# Patient Record
Sex: MALE | Race: WHITE | Employment: FULL TIME | URBAN - NONMETROPOLITAN AREA
[De-identification: names, ages, dates, MRNs, and addresses within clinical notes are randomized per-mention and may not be internally consistent; named-entity substitution may affect disease eponyms.]

---

## 2019-09-04 ENCOUNTER — OFFICE VISIT (OUTPATIENT)
Dept: FAMILY MEDICINE CLINIC | Age: 39
End: 2019-09-04
Payer: COMMERCIAL

## 2019-09-04 VITALS
HEIGHT: 75 IN | HEART RATE: 75 BPM | OXYGEN SATURATION: 98 % | BODY MASS INDEX: 26.24 KG/M2 | SYSTOLIC BLOOD PRESSURE: 105 MMHG | TEMPERATURE: 98 F | DIASTOLIC BLOOD PRESSURE: 70 MMHG | WEIGHT: 211 LBS

## 2019-09-04 DIAGNOSIS — J06.9 UPPER RESPIRATORY TRACT INFECTION, UNSPECIFIED TYPE: Primary | ICD-10-CM

## 2019-09-04 PROCEDURE — 99213 OFFICE O/P EST LOW 20 MIN: CPT | Performed by: PHYSICIAN ASSISTANT

## 2019-09-04 RX ORDER — BROMPHENIRAMINE MALEATE, PSEUDOEPHEDRINE HYDROCHLORIDE, AND DEXTROMETHORPHAN HYDROBROMIDE 2; 30; 10 MG/5ML; MG/5ML; MG/5ML
5 SYRUP ORAL 4 TIMES DAILY PRN
Qty: 120 ML | Refills: 0 | Status: SHIPPED | OUTPATIENT
Start: 2019-09-04 | End: 2019-09-11

## 2021-02-12 ENCOUNTER — OFFICE VISIT (OUTPATIENT)
Dept: FAMILY MEDICINE CLINIC | Age: 41
End: 2021-02-12
Payer: COMMERCIAL

## 2021-02-12 VITALS
OXYGEN SATURATION: 98 % | HEIGHT: 75 IN | BODY MASS INDEX: 25.12 KG/M2 | SYSTOLIC BLOOD PRESSURE: 120 MMHG | WEIGHT: 202 LBS | HEART RATE: 67 BPM | DIASTOLIC BLOOD PRESSURE: 78 MMHG | TEMPERATURE: 96.9 F

## 2021-02-12 DIAGNOSIS — S61.208A OPEN WOUND OF INDEX FINGER: ICD-10-CM

## 2021-02-12 DIAGNOSIS — S67.190A CRUSHING INJURY OF RIGHT INDEX FINGER, INITIAL ENCOUNTER: Primary | ICD-10-CM

## 2021-02-12 PROCEDURE — 99214 OFFICE O/P EST MOD 30 MIN: CPT | Performed by: PHYSICIAN ASSISTANT

## 2021-02-12 RX ORDER — DOXYCYCLINE 100 MG/1
100 CAPSULE ORAL 2 TIMES DAILY
Qty: 20 CAPSULE | Refills: 0 | Status: SHIPPED | OUTPATIENT
Start: 2021-02-12 | End: 2021-02-22

## 2021-02-12 NOTE — PATIENT INSTRUCTIONS
This vaccine works by exposing you to a small dose of the bacteria or a protein from the bacteria, which causes the body to develop immunity to the disease. This vaccine will not treat an active infection that has already developed in the body. Like any vaccine, the Tdap vaccine may not provide protection from disease in every person. What should I discuss with my healthcare provider before receiving this vaccine? You should not receive this vaccine if:  · you had a life-threatening allergic reaction to any vaccine that contains tetanus, diphtheria, or pertussis; or  · you had a neurologic disorder affecting your brain (such as loss of consciousness or a prolonged seizure) within 7 days after having a previous pertussis vaccine. You may not be able to receive a Tdap vaccine if you have ever received a similar vaccine that caused any of the following:  · a very high fever (over 104 degrees Fahrenheit);  · a neurologic disorder or disease affecting the brain;  · fainting or going into shock;  · severe pain, redness, tenderness, swelling, or a lump where the shot was given;  · an allergy to latex rubber;  · severe or uncontrolled epilepsy or other seizure disorder; or  · Guillain-Barré syndrome (within 6 weeks after receiving a vaccine containing tetanus). If you have any of these other conditions, your vaccine may need to be postponed or not given at all:  · a history of seizures;  · a weak immune system caused by disease, bone marrow transplant, or by using certain medicines or receiving cancer treatments; or  · if it has been less than 10 years since you last received a tetanus shot. You can still receive a vaccine if you have a minor cold. In the case of a more severe illness with a fever or any type of infection, wait until you get better before receiving this vaccine. This list is not complete. Other drugs may interact with this vaccine, including prescription and over-the-counter medicines, vitamins, and herbal products. Not all possible interactions are listed in this medication guide. Where can I get more information? Your doctor or pharmacist can provide more information about this vaccine. Additional information is available from your local health department or the Centers for Disease Control and Prevention. Remember, keep this and all other medicines out of the reach of children, never share your medicines with others, and use this medication only for the indication prescribed. Every effort has been made to ensure that the information provided by Katty Aguilar [address] is accurate, up-to-date, and complete, but no guarantee is made to that effect. Drug information contained herein may be time sensitive. [facility] information has been compiled for use by healthcare practitioners and consumers in the [place] and therefore [facility] does not warrant that uses outside of the [place] are appropriate, unless specifically indicated otherwise. [facility]'s drug information does not endorse drugs, diagnose patients or recommend therapy. [facility]'s drug information is an informational resource designed to assist licensed healthcare practitioners in caring for their patients and/or to serve consumers viewing this service as a supplement to, and not a substitute for, the expertise, skill, knowledge and judgment of healthcare practitioners. The absence of a warning for a given drug or drug combination in no way should be construed to indicate that the drug or drug combination is safe, effective or appropriate for any given patient. [facility] does not assume any responsibility for any aspect of healthcare administered with the aid of information [facility] provides. The information contained herein is not intended to cover all possible uses, directions, precautions, warnings, drug interactions, allergic reactions, or adverse effects. If you have questions about the drugs you are taking, check with your doctor, nurse or pharmacist.  Copyright 9572-8686 [address] [place] [address] Dr SHARMA. Version: 3.01. Revision date: 8/19/2016. Care instructions adapted under license by [facility] ([facility]). If you have questions about a medical condition or this instruction, always ask your healthcare professional. [place] [address] any warranty or liability for your use of this information.

## 2021-02-12 NOTE — PROGRESS NOTES
 Drug use: Not on file       Physical Exam:     Vitals:    02/12/21 0951   BP: 120/78   Pulse: 67   Temp: 96.9 °F (36.1 °C)   SpO2: 98%   Weight: 202 lb (91.6 kg)   Height: 6' 3\" (1.905 m)         Exam:  Const: Appears healthy and well developed. No signs of acute distress present. Vitals reviewed per triage. Head/Face: Normocephalic, atraumatic. Facies is symmetric. Eyes: PERRL. ENMT: Nares are patent  Neck: Supple and symmetric. Trachea midline. Resp: No respiratory distress   Musculo: Patient with a superficial 1 cm laceration on the medial aspect of the distal index finger. There is no sign of a secondary infection. There does not appear to be a nail injury. Nailbed is intact. Patient has full range of motion of the finger. No tendon injury. Neurovascularly he is intact. No other injury pain deformity noted on the right hand. Skin: Skin is warm and dry. Neuro: Alert and oriented x3. Speech is articulate and fluent. Psych: Patient's mood and affect is appropriate to situation. Testing:           Medical Decision Making:           Clinical Impression:   Serge was seen today for finger injury. Diagnoses and all orders for this visit:    Crushing injury of right index finger, initial encounter  -     doxycycline monohydrate (MONODOX) 100 MG capsule; Take 1 capsule by mouth 2 times daily for 10 days  -     Tetanus-Diphth-Acell Pertussis (BOOSTRIX) injection 0.5 mL  -     XR FINGER RIGHT (MIN 2 VIEWS); Future    Open wound of index finger      Trays negative for acute findings. No sign of a tendon injury. Reviewed wound care instructions. Antibiotic prescribed. The patient is to call for any concerns or return if any of the signs or symptoms worsen. The patient is to follow-up with PCP in the next 2-3 days for repeat evaluation repeat assessment or go directly to the emergency department.      SIGNATURE: Tom Key PA-C

## 2021-10-15 ENCOUNTER — OFFICE VISIT (OUTPATIENT)
Dept: PRIMARY CARE CLINIC | Age: 41
End: 2021-10-15
Payer: COMMERCIAL

## 2021-10-15 VITALS
TEMPERATURE: 97.5 F | WEIGHT: 213 LBS | BODY MASS INDEX: 26.49 KG/M2 | DIASTOLIC BLOOD PRESSURE: 80 MMHG | SYSTOLIC BLOOD PRESSURE: 122 MMHG | OXYGEN SATURATION: 97 % | HEIGHT: 75 IN | HEART RATE: 88 BPM

## 2021-10-15 DIAGNOSIS — U07.1 COVID-19 VIRUS INFECTION: Primary | ICD-10-CM

## 2021-10-15 DIAGNOSIS — R05.9 COUGH: ICD-10-CM

## 2021-10-15 PROCEDURE — 99213 OFFICE O/P EST LOW 20 MIN: CPT | Performed by: NURSE PRACTITIONER

## 2021-10-15 RX ORDER — AZITHROMYCIN 250 MG/1
250 TABLET, FILM COATED ORAL SEE ADMIN INSTRUCTIONS
Qty: 6 TABLET | Refills: 0 | Status: SHIPPED | OUTPATIENT
Start: 2021-10-15 | End: 2021-10-20

## 2021-10-15 RX ORDER — PREDNISONE 20 MG/1
20 TABLET ORAL 2 TIMES DAILY
Qty: 10 TABLET | Refills: 0 | Status: SHIPPED | OUTPATIENT
Start: 2021-10-15 | End: 2021-10-20

## 2021-10-15 NOTE — PATIENT INSTRUCTIONS
Patient Education        Learning About Coronavirus [phone])  What is coronavirus (COVID-19)? COVID-19 is a disease caused by a type of coronavirus. This illness was first found in December 2019. It has since spread worldwide. Coronaviruses are a large group of viruses. They cause the common cold. They also cause more serious illnesses like [place] respiratory syndrome (MERS) and severe acute respiratory syndrome (SARS). COVID-19 is caused by a novel coronavirus. That means it's a new type that has not been seen in people before. What are the symptoms? COVID-19 symptoms may include:  · Fever. · Cough. · Trouble breathing. · Chills or repeated shaking with chills. · Muscle and body aches. · Headache. · Sore throat. · New loss of taste or smell. · Vomiting. · Diarrhea. In severe cases, COVID-19 can cause pneumonia and make it hard to breathe without help from a machine. It can cause death. How is it diagnosed? COVID-19 is diagnosed with a viral test. This may also be called a PCR test or antigen test. It looks for evidence of the virus in your breathing passages or lungs (respiratory system). The test is most often done on a sample from the nose, throat, or lungs. It's sometimes done on a sample of saliva. One way a sample is collected is by putting a long swab into the back of your nose. How is it treated? Mild cases of COVID-19 can be treated at home. Serious cases need treatment in the hospital. Treatment may include medicines to reduce symptoms, plus breathing support such as oxygen therapy or a ventilator. Some people may be placed on their belly to help their oxygen levels. Treatments that may help people who have COVID-19 include:  Antiviral medicines. These medicines treat viral infections. Remdesivir is an example. Immune-based therapy. These medicines help the immune system fight COVID-19. Examples include monoclonal antibodies. Blood thinners.    These medicines help prevent blood clots. People with severe illness are at risk for blood clots. How can you protect yourself and others? The best way to protect yourself from getting sick is to:  · Get vaccinated. · Avoid sick people. · If you are not fully vaccinated:  ? Wear a mask if you have to go to public areas. ? Avoid crowds and try to stay at least 6 feet away from other people. · Cover your mouth with a tissue when you cough or sneeze. · Wash your hands often, especially after you cough or sneeze. Use soap and water, and scrub for at least 20 seconds. If soap and water aren't available, use an alcohol-based hand . · Avoid touching your mouth, nose, and eyes. To help avoid spreading the virus to others:  · Get vaccinated. · Cover your mouth with a tissue when you cough or sneeze. · Wash your hands often, especially after you cough or sneeze. Use soap and water, and scrub for at least 20 seconds. If soap and water aren't available, use an alcohol-based hand . · If you have been exposed to the virus and are not fully vaccinated:  ? Stay home. Don't go to school, work, or public areas. And don't use public transportation, ride-shares, or taxis unless you have no choice. ? Wear a mask if you have to go to public areas, like the pharmacy. · If you're sick:  ? Leave your home only if you need to get medical care. But call the doctor's office first so they know you're coming. And wear a mask. ? Wear a mask whenever you're around other people. ? Limit contact with pets and people in your home. If possible, stay in a separate bedroom and use a separate bathroom. ? Clean and disinfect your home every day. Use household  and disinfectant wipes or sprays. Take special care to clean things that you touch with your hands. How can you self-isolate when you have COVID-19? If you have COVID-19, there are things you can do to help avoid spreading the virus to others.   · Limit contact with people in your home. If possible, stay in a separate bedroom and use a separate bathroom. · Wear a mask when you are around other people. · If you have to leave home, avoid crowds and try to stay at least 6 feet away from other people. · Avoid contact with pets and other animals. · Cover your mouth and nose with a tissue when you cough or sneeze. Then throw it in the trash right away. · Wash your hands often, especially after you cough or sneeze. Use soap and water, and scrub for at least 20 seconds. If soap and water aren't available, use an alcohol-based hand . · Don't share personal household items. These include bedding, towels, cups and glasses, and eating utensils. · [address] in the warmest water allowed for the fabric type, and dry it completely. It's okay to wash other people's laundry with yours. · Clean and disinfect your home. Use household  and disinfectant wipes or sprays. When should you call for help? Call 911 anytime you think you may need emergency care. For example, call if you have life-threatening symptoms, such as:    · You have severe trouble breathing. (You can't talk at all.)     · You have constant chest pain or pressure.     · You are severely dizzy or lightheaded.     · You are confused or can't think clearly.     · You have pale, gray, or blue-colored skin or lips.     · You pass out (lose consciousness) or are very hard to wake up. Call your doctor now or seek immediate medical care if:    · You have moderate trouble breathing. (You can't speak a full sentence.)     · You are coughing up blood (more than about 1 teaspoon).     · You have signs of low blood pressure. These include feeling lightheaded; being too weak to stand; and having cold, pale, clammy skin.    Watch closely for changes in your health, and be sure to contact your doctor if:    · Your symptoms get worse.     · You are not getting better as expected.     · You have new or worse symptoms of anxiety, depression, nightmares, or flashbacks. Call before you go to the doctor's office. Follow their instructions. And wear a mask. Current as of: July 1, 2021               Content Version: 13.0  © 2006-2021 [employer]. Care instructions adapted under license by [facility] ([facility]). If you have questions about a medical condition or this instruction, always ask your healthcare professional. [place] [address] any warranty or liability for your use of this information. Patient Education        Coronavirus ([id number]): Care Instructions  Overview  The coronavirus disease (COVID-19) is caused by a virus. Symptoms may include a fever, a cough, and shortness of breath. It can spread through droplets from coughing and sneezing, breathing, and singing. The virus also can spread when people are in close contact with someone who is infected. Most people have mild symptoms and can take care of themselves at home. If their symptoms get worse, they may need care in a hospital. Treatment may include medicines to reduce symptoms, plus breathing support such as oxygen therapy or a ventilator. It's important to not spread the virus to others. If you have COVID-19, wear a mask anytime you are around other people. It can help stop the spread of the virus. You need to isolate yourself while you are sick. Leave your home only if you need to get medical care or testing. Follow-up care is a key part of your treatment and safety. Be sure to make and go to all appointments, and call your doctor if you are having problems. It's also a good idea to know your test results and keep a list of the medicines you take. How can you care for yourself at home? · Get extra rest. It can help you feel better. · Drink plenty of fluids. This helps replace fluids lost from fever. Fluids may also help ease a scratchy throat. · You can take acetaminophen (Tylenol) or ibuprofen (Advil, Motrin) to reduce a fever.  It may also help with muscle and body aches. Read and follow all instructions on the label. · Use petroleum jelly on sore skin. This can help if the skin around your nose and lips becomes sore from rubbing a lot with tissues. If you use oxygen, use a water-based product instead of petroleum jelly. · Keep track of symptoms such as fever and shortness of breath. This can help you know if you need to call your doctor. It can also help you know when it's safe to be around other people. · In some cases, your doctor might suggest that you get a pulse oximeter. How can you self-isolate when you have COVID-19? If you have COVID-19, there are things you can do to help avoid spreading the virus to others. · Limit contact with people in your home. If possible, stay in a separate bedroom and use a separate bathroom. · Wear a mask when you are around other people. · If you have to leave home, avoid crowds and try to stay at least 6 feet away from other people. · Avoid contact with pets and other animals. · Cover your mouth and nose with a tissue when you cough or sneeze. Then throw it in the trash right away. · Wash your hands often, especially after you cough or sneeze. Use soap and water, and scrub for at least 20 seconds. If soap and water aren't available, use an alcohol-based hand . · Don't share personal household items. These include bedding, towels, cups and glasses, and eating utensils. · [address] in the warmest water allowed for the fabric type, and dry it completely. It's okay to wash other people's laundry with yours. · Clean and disinfect your home. Use household  and disinfectant wipes or sprays. When can you end self-isolation for COVID-19? If you know or think that you have the virus, you will need to self-isolate.  You can be around others after:  · It's been at least 10 days since your symptoms started and  · You haven't had a fever for 24 hours without taking medicines to lower the fever and  · Your symptoms are improving. If you tested positive but have no symptoms, you can end isolation after 10 days. But if you start to have symptoms, follow the steps above. Ask your doctor if you need to be tested before you end isolation. This is especially important if you have a weakened immune system. When should you call for help? Call 911 anytime you think you may need emergency care. For example, call if you have life-threatening symptoms, such as:    · You have severe trouble breathing. (You can't talk at all.)     · You have constant chest pain or pressure.     · You are severely dizzy or lightheaded.     · You are confused or can't think clearly.     · You have pale, gray, or blue-colored skin or lips.     · You pass out (lose consciousness) or are very hard to wake up. Call your doctor now or seek immediate medical care if:    · You have moderate trouble breathing. (You can't speak a full sentence.)     · You are coughing up blood (more than about 1 teaspoon).     · You have signs of low blood pressure. These include feeling lightheaded; being too weak to stand; and having cold, pale, clammy skin. Watch closely for changes in your health, and be sure to contact your doctor if:    · Your symptoms get worse.     · You are not getting better as expected.     · You have new or worse symptoms of anxiety, depression, nightmares, or flashbacks. Call before you go to the doctor's office. Follow their instructions. And wear a mask. Current as of: July 1, 2021               Content Version: 13.0  © 2006-2021 [employer]. Care instructions adapted under license by [facility] ([facility]). If you have questions about a medical condition or this instruction, always ask your healthcare professional. [place] [address] any warranty or liability for your use of this information. Patient Education        Learning About COVID-19 and Social Distancing  What is it?   Social distancing means putting space between yourself and other people. The recommended distance is 6 feet, or about 2 meters. This also means staying away from any place where people may gather, such as singer or other public gathering places. Why is it important? Social distancing is the best way to reduce the spread of COVID-19. This virus seems to spread from person to person through droplets from coughing and sneezing. So if you keep your distance from others, you're less likely to get it or spread it. And social distancing is important for everyone, not just those who are at high risk of infection, like older people. You might have the virus but not have symptoms. You could then give the infection to someone you come into contact with. How is it done? Experts recommend putting at least 6 feet (2 meters) between you and other people. And wear masks if you are around people you don't live with. So follow this advice, if possible. · Work from home. · Avoid having visitors. If you have to have visitors, they need to wear a mask and stay at least 6 feet (2 meters) away from you. And keep the visit as short as possible. · Increase airflow in your home if people visit. Here's how:  ? If you can, open some windows or doors to the outside. ? Use a fan to blow air away from people and out a window. ? Turn on exhaust fans in your kitchen and bathroom. Doing these things can help reduce the amount of virus particles (droplets) that travel through the air. · Don't travel if you don't have to. And avoid public transportation, ride-shares, and taxis unless you have no choice. · Limit shopping to essentials, like food and medicines. · Don't eat in restaurants. You can still get takeout or food deliveries. · Avoid crowds and busy places. Be sure to follow all instructions from your local health authorities. Where can you learn more? Go to https://nik[facility]. org and sign in to your MyChart account. Enter A133 in the [facility] box to learn more about \"Learning About COVID-19 and Social Distancing. \"     If you do not have an account, please click on the \"Sign Up Now\" link. Current as of: March 26, 2021               Content Version: 12.9  © 7404-4388 [employer]. Care instructions adapted under license by [facility] ([facility]). If you have questions about a medical condition or this instruction, always ask your healthcare professional. [address] any warranty or liability for your use of this information.

## 2021-10-15 NOTE — PROGRESS NOTES
Chief Complaint   Concern For COVID-19 (Cough,chest and sinus congestion,drainage,nausiated,and fatigue X 1 week. )      History of Present Illness   Source of history provided by:  patient. Olayinka Mendez is a 39 y.o. old male who presents to the flu clinic with complaints of Fever, Generalized Body Aches, Headache, Nasal Congestion, Post nasal drip, Prod Cough, Nausea and Vomiting x 7 days. States symptoms have stayed the same since onset. Has been taking none without symptomatic relief. Denies any Shortness of breath. Denies any hx of no history of pneumonia or bronchitis. ROS   Pertinent positives and negatives are stated within HPI, all other systems reviewed and are negative. Past Medical History:  has no past medical history on file. Past Surgical History:  has no past surgical history on file. Social History:  reports that he has never smoked. He has never used smokeless tobacco.  Family History: family history is not on file. Allergies: Amoxicillin    Physical Exam   Vital Signs:  /80   Pulse 88   Temp 97.5 °F (36.4 °C)   Ht 6' 3\" (1.905 m)   Wt 213 lb (96.6 kg)   SpO2 97%   BMI 26.62 kg/m²    Oxygen Saturation Interpretation: Normal.    Constitutional:  Alert, development consistent with age. NAD. Head:  NC/NT. Airway patent. Ears: TMs Clear bilaterally. Canals without exudate or swelling bilaterally. Mouth: Posterior pharynx with mild erythema and clear postnasal drip. no tonsillar hypertrophy or exudate. Neck:  Normal ROM. Supple. no anterior cervical adenopathy noted. Lungs: CTAB without wheezes, rales, or rhonchi. CV:  Regular rate and rhythm, normal heart sounds, without pathological murmurs, ectopy, gallops, or rubs. Skin:  Normal turgor. Warm, dry, without visible rash. Lymphatic: No lymphangitis or adenopathy noted. Neurological:  Oriented. Motor functions intact.     Lab / Imaging Results   (All laboratory and radiology results have been personally reviewed by myself)  Labs:  No results found for this visit on 10/15/21. Imaging: All Radiology results interpreted by Radiologist unless otherwise noted. No results found. Medical Decision Making   Pt non-toxic, in no apparent distress and stable at time of discharge. Assessment/Plan   There are no diagnoses linked to this encounter. COVID-19 swab obtained and Positive, Patient advised to self-quarantine at home. Work excuse provided to patient today. Increase fluids and rest. Symptomatic relief discussed including Tylenol prn pain/fever. Schedule f/u with PCP in 7-10 days if symptoms persist. ED sooner if symptoms worsen or change. ED immediately with high or refractory fever, progressive SOB, dyspnea, CP, calf pain/swelling, shaking chills, vomiting, abdominal pain, lethargy, flank pain, or decreased urinary output. Pt verbalizes understanding and is in agreement with plan of care. All questions answered. [facility], APRN - CNP    This visit was provided as a focused evaluation during the COVID -19 pandemic/national emergency. A comprehensive review of all previous patient history and testing was not conducted. Pertinent findings were elicited during the visit. *NOTE: This report was transcribed using voice recognition software. Every effort was made to ensure accuracy; however, inadvertent computerized transcription errors may be present.

## 2021-10-15 NOTE — LETTER
NOTIFICATION RETURN TO WORK / SCHOOL    10/15/2021    Mr. Temo Burciaga  [facility] [address]  [address] [address]      To Whom It May Concern:    Temo Burciaga was tested for COVID-19 on 10/15, and the result was Positive. He may return to work on 10/19. I recommend:return without restrictions    If there are questions or concerns, please have the patient contact our office.         Sincerely,          MAJOR Epstein - CNP

## 2024-07-29 ENCOUNTER — TELEPHONE (OUTPATIENT)
Dept: PRIMARY CARE CLINIC | Age: 44
End: 2024-07-29

## 2024-07-29 NOTE — TELEPHONE ENCOUNTER
----- Message from Ananya Parham Jr. sent at 7/29/2024 11:38 AM EDT -----  Regarding: [facility] Appointment Request  [facility] Appointment Request    Patient needs appointment for [facility] Appointment Type: Annual Physical Visit.    Patient Requested Dates(s): around this week if possible   Patient Requested Time: afternoon around 2  Provider Name: Patient's PCP    Reason for Appointment Request: Established Patient - Available appointments did not meet patient need. Patient is on vacation this week and would like to have the appointment on an afternoon if possible. Any day around this week will do.  --------------------------------------------------------------------------------------------------------------------------    Relationship to Patient: Self     Call Back Information: OK to leave message on voicemail  Preferred Call Back Number: [phone]

## 2025-03-03 ENCOUNTER — OFFICE VISIT (OUTPATIENT)
Dept: PRIMARY CARE CLINIC | Age: 45
End: 2025-03-03
Payer: COMMERCIAL

## 2025-03-03 VITALS
DIASTOLIC BLOOD PRESSURE: 78 MMHG | OXYGEN SATURATION: 98 % | BODY MASS INDEX: 26.61 KG/M2 | HEIGHT: 75 IN | SYSTOLIC BLOOD PRESSURE: 130 MMHG | RESPIRATION RATE: 18 BRPM | HEART RATE: 68 BPM | WEIGHT: 214 LBS | TEMPERATURE: 98 F

## 2025-03-03 DIAGNOSIS — J06.9 VIRAL URI WITH COUGH: Primary | ICD-10-CM

## 2025-03-03 DIAGNOSIS — J02.9 SORE THROAT: ICD-10-CM

## 2025-03-03 LAB
INFLUENZA A ANTIGEN, POC: NEGATIVE
INFLUENZA B ANTIGEN, POC: NEGATIVE
LOT EXPIRE DATE: NORMAL
LOT KIT NUMBER: NORMAL
S PYO AG THROAT QL: NORMAL
SARS-COV-2, POC: NORMAL
VALID INTERNAL CONTROL: NORMAL
VENDOR AND KIT NAME POC: NORMAL

## 2025-03-03 PROCEDURE — 87880 STREP A ASSAY W/OPTIC: CPT

## 2025-03-03 PROCEDURE — 99203 OFFICE O/P NEW LOW 30 MIN: CPT

## 2025-03-03 PROCEDURE — 87428 SARSCOV & INF VIR A&B AG IA: CPT

## 2025-03-03 SDOH — ECONOMIC STABILITY: FOOD INSECURITY: WITHIN THE PAST 12 MONTHS, YOU WORRIED THAT YOUR FOOD WOULD RUN OUT BEFORE YOU GOT MONEY TO BUY MORE.: NEVER TRUE

## 2025-03-03 SDOH — ECONOMIC STABILITY: FOOD INSECURITY: WITHIN THE PAST 12 MONTHS, THE FOOD YOU BOUGHT JUST DIDN'T LAST AND YOU DIDN'T HAVE MONEY TO GET MORE.: NEVER TRUE

## 2025-03-03 ASSESSMENT — PATIENT HEALTH QUESTIONNAIRE - PHQ9
SUM OF ALL RESPONSES TO PHQ QUESTIONS 1-9: 0
2. FEELING DOWN, DEPRESSED OR HOPELESS: NOT AT ALL
1. LITTLE INTEREST OR PLEASURE IN DOING THINGS: NOT AT ALL
SUM OF ALL RESPONSES TO PHQ QUESTIONS 1-9: 0

## 2025-03-03 NOTE — PROGRESS NOTES
negative.    -Offered XR chest, and patient declines. Lungs were clear to auscultation bilaterally on exam without wheezes, rales, or rhonchi.    -Patient advised illness is likely viral and should resolve with time and conservative measures. Offered prescription cough medications, and patient declines. Increase fluids and rest. Other symptomatic relief discussed. PCP in 3-5 days for recheck if symptoms persist. ED sooner if symptoms worsen or change. ED immediately with high or refractory fever, dyspnea, CP, shaking chills, vomiting, abdominal pain, lethargy, etc. Patient states understanding and is in agreement with this care plan. All questions answered.    Asiya Chavez PA-C    **This report was transcribed using voice recognition software. Every effort was made to ensure accuracy; however, inadvertent computerized transcription errors may be present.

## 2025-03-17 ENCOUNTER — OFFICE VISIT (OUTPATIENT)
Dept: PRIMARY CARE CLINIC | Age: 45
End: 2025-03-17
Payer: COMMERCIAL

## 2025-03-17 VITALS
WEIGHT: 217 LBS | DIASTOLIC BLOOD PRESSURE: 78 MMHG | OXYGEN SATURATION: 98 % | HEART RATE: 70 BPM | RESPIRATION RATE: 14 BRPM | BODY MASS INDEX: 26.98 KG/M2 | HEIGHT: 75 IN | SYSTOLIC BLOOD PRESSURE: 126 MMHG | TEMPERATURE: 98 F

## 2025-03-17 DIAGNOSIS — J40 SINOBRONCHITIS: Primary | ICD-10-CM

## 2025-03-17 DIAGNOSIS — R05.2 SUBACUTE COUGH: ICD-10-CM

## 2025-03-17 DIAGNOSIS — J32.9 SINOBRONCHITIS: Primary | ICD-10-CM

## 2025-03-17 LAB
INFLUENZA A ANTIGEN, POC: NEGATIVE
INFLUENZA B ANTIGEN, POC: NEGATIVE
LOT EXPIRE DATE: NORMAL
LOT KIT NUMBER: NORMAL
SARS-COV-2, POC: NORMAL
VALID INTERNAL CONTROL: NORMAL
VENDOR AND KIT NAME POC: NORMAL

## 2025-03-17 PROCEDURE — 87428 SARSCOV & INF VIR A&B AG IA: CPT

## 2025-03-17 PROCEDURE — 99213 OFFICE O/P EST LOW 20 MIN: CPT

## 2025-03-17 RX ORDER — DOXYCYCLINE HYCLATE 100 MG
100 TABLET ORAL 2 TIMES DAILY
Qty: 20 TABLET | Refills: 0 | Status: SHIPPED | OUTPATIENT
Start: 2025-03-17 | End: 2025-03-27

## 2025-03-17 RX ORDER — ALBUTEROL SULFATE 90 UG/1
2 INHALANT RESPIRATORY (INHALATION) 4 TIMES DAILY PRN
Qty: 18 G | Refills: 0 | Status: SHIPPED | OUTPATIENT
Start: 2025-03-17

## 2025-03-17 RX ORDER — BENZONATATE 200 MG/1
200 CAPSULE ORAL 3 TIMES DAILY PRN
Qty: 30 CAPSULE | Refills: 0 | Status: SHIPPED | OUTPATIENT
Start: 2025-03-17 | End: 2025-03-27

## 2025-03-17 NOTE — PROGRESS NOTES
Chief Complaint   Cough, Congestion (chest), Sinus Problem, and Other (Started over 3 weeks )      History of Present Illness   Source of history provided by: patient.      Riccardo Bishop is a 44 y.o. old male presenting to the walk in clinic for evaluation of a productive cough, chest congestion, and mild SOB associated with coughing fits x 3 weeks. Reports sinus pressure, nasal congestion, nasal drainage, and sore throat. Has been taking Theraflu OTC without relief. Denies any fever, chills, generalized body aches, neck pain/stiffness, dizziness, vision changes, ear pain, dysphagia, hemoptysis, CP, or GI symptoms. Denies any hx of asthma, COPD, or tobacco use. Denies any contact with any individuals with known COVID-19 infection or under investigation for COVID-19 infection. Patient is tolerating food and liquids PO.     ROS    Unless otherwise stated in this report or unable to obtain because of the patient's clinical or mental status as evidenced by the medical record, this patients's positive and negative responses for Review of Systems, constitutional, psych, eyes, ENT, cardiovascular, respiratory, gastrointestinal, neurological, genitourinary, musculoskeletal, integument systems and systems related to the presenting problem are either stated in the preceding or were not pertinent or were negative for the symptoms and/or complaints related to the medical problem.    Past Medical History:  has no past medical history on file.  Past Surgical History:  has no past surgical history on file.  Social History:  reports that he has never smoked. He has never used smokeless tobacco.  Family History: family history is not on file.   Allergies: Amoxicillin    Physical Exam         VS:  /78 (BP Site: Left Upper Arm, Patient Position: Sitting, BP Cuff Size: Large Adult)   Pulse 70   Temp 98 °F (36.7 °C) (Temporal)   Resp 14   Ht 1.905 m (6' 3\")   Wt 98.4 kg (217 lb)   SpO2 98%   BMI 27.12 kg/m²  Oxygen